# Patient Record
Sex: MALE | Race: WHITE | NOT HISPANIC OR LATINO | Employment: FULL TIME | URBAN - METROPOLITAN AREA
[De-identification: names, ages, dates, MRNs, and addresses within clinical notes are randomized per-mention and may not be internally consistent; named-entity substitution may affect disease eponyms.]

---

## 2024-06-16 ENCOUNTER — HOSPITAL ENCOUNTER (EMERGENCY)
Facility: CLINIC | Age: 59
Discharge: HOME | End: 2024-06-16
Attending: EMERGENCY MEDICINE | Admitting: EMERGENCY MEDICINE
Payer: COMMERCIAL

## 2024-06-16 ENCOUNTER — APPOINTMENT (OUTPATIENT)
Dept: GENERAL RADIOLOGY | Facility: CLINIC | Age: 59
End: 2024-06-16
Attending: EMERGENCY MEDICINE
Payer: COMMERCIAL

## 2024-06-16 VITALS
BODY MASS INDEX: 24.5 KG/M2 | SYSTOLIC BLOOD PRESSURE: 117 MMHG | DIASTOLIC BLOOD PRESSURE: 77 MMHG | TEMPERATURE: 96.8 F | WEIGHT: 175 LBS | OXYGEN SATURATION: 95 % | RESPIRATION RATE: 11 BRPM | HEIGHT: 71 IN | HEART RATE: 58 BPM

## 2024-06-16 DIAGNOSIS — R07.89 ATYPICAL CHEST PAIN: ICD-10-CM

## 2024-06-16 LAB
ALBUMIN SERPL BCG-MCNC: 4.1 G/DL (ref 3.5–5.2)
ALP SERPL-CCNC: 64 U/L (ref 40–150)
ALT SERPL W P-5'-P-CCNC: 32 U/L (ref 0–70)
ANION GAP SERPL CALCULATED.3IONS-SCNC: 11 MMOL/L (ref 7–15)
AST SERPL W P-5'-P-CCNC: 20 U/L (ref 0–45)
ATRIAL RATE - MUSE: 68 BPM
BASOPHILS # BLD AUTO: 0.1 10E3/UL (ref 0–0.2)
BASOPHILS NFR BLD AUTO: 1 %
BILIRUB SERPL-MCNC: 0.3 MG/DL
BUN SERPL-MCNC: 15.5 MG/DL (ref 8–23)
CALCIUM SERPL-MCNC: 9 MG/DL (ref 8.6–10)
CHLORIDE SERPL-SCNC: 103 MMOL/L (ref 98–107)
CREAT SERPL-MCNC: 0.91 MG/DL (ref 0.67–1.17)
D DIMER PPP FEU-MCNC: <0.27 UG/ML FEU (ref 0–0.5)
DEPRECATED HCO3 PLAS-SCNC: 23 MMOL/L (ref 22–29)
DIASTOLIC BLOOD PRESSURE - MUSE: NORMAL MMHG
EGFRCR SERPLBLD CKD-EPI 2021: >90 ML/MIN/1.73M2
EOSINOPHIL # BLD AUTO: 0.3 10E3/UL (ref 0–0.7)
EOSINOPHIL NFR BLD AUTO: 4 %
ERYTHROCYTE [DISTWIDTH] IN BLOOD BY AUTOMATED COUNT: 12.4 % (ref 10–15)
GLUCOSE SERPL-MCNC: 91 MG/DL (ref 70–99)
HCT VFR BLD AUTO: 41.2 % (ref 40–53)
HGB BLD-MCNC: 14.3 G/DL (ref 13.3–17.7)
HOLD SPECIMEN: NORMAL
IMM GRANULOCYTES # BLD: 0 10E3/UL
IMM GRANULOCYTES NFR BLD: 0 %
INTERPRETATION ECG - MUSE: NORMAL
LYMPHOCYTES # BLD AUTO: 2 10E3/UL (ref 0.8–5.3)
LYMPHOCYTES NFR BLD AUTO: 28 %
MCH RBC QN AUTO: 30.4 PG (ref 26.5–33)
MCHC RBC AUTO-ENTMCNC: 34.7 G/DL (ref 31.5–36.5)
MCV RBC AUTO: 88 FL (ref 78–100)
MONOCYTES # BLD AUTO: 0.6 10E3/UL (ref 0–1.3)
MONOCYTES NFR BLD AUTO: 9 %
NEUTROPHILS # BLD AUTO: 4.1 10E3/UL (ref 1.6–8.3)
NEUTROPHILS NFR BLD AUTO: 58 %
NRBC # BLD AUTO: 0 10E3/UL
NRBC BLD AUTO-RTO: 0 /100
P AXIS - MUSE: 65 DEGREES
PLATELET # BLD AUTO: 271 10E3/UL (ref 150–450)
POTASSIUM SERPL-SCNC: 3.8 MMOL/L (ref 3.4–5.3)
PR INTERVAL - MUSE: 164 MS
PROT SERPL-MCNC: 6.5 G/DL (ref 6.4–8.3)
QRS DURATION - MUSE: 98 MS
QT - MUSE: 396 MS
QTC - MUSE: 421 MS
R AXIS - MUSE: 112 DEGREES
RBC # BLD AUTO: 4.71 10E6/UL (ref 4.4–5.9)
SODIUM SERPL-SCNC: 137 MMOL/L (ref 135–145)
SYSTOLIC BLOOD PRESSURE - MUSE: NORMAL MMHG
T AXIS - MUSE: 63 DEGREES
TROPONIN T SERPL HS-MCNC: 11 NG/L
VENTRICULAR RATE- MUSE: 68 BPM
WBC # BLD AUTO: 7.2 10E3/UL (ref 4–11)

## 2024-06-16 PROCEDURE — 85025 COMPLETE CBC W/AUTO DIFF WBC: CPT | Performed by: EMERGENCY MEDICINE

## 2024-06-16 PROCEDURE — 84484 ASSAY OF TROPONIN QUANT: CPT | Performed by: STUDENT IN AN ORGANIZED HEALTH CARE EDUCATION/TRAINING PROGRAM

## 2024-06-16 PROCEDURE — 99285 EMERGENCY DEPT VISIT HI MDM: CPT | Mod: 25

## 2024-06-16 PROCEDURE — 80053 COMPREHEN METABOLIC PANEL: CPT | Performed by: STUDENT IN AN ORGANIZED HEALTH CARE EDUCATION/TRAINING PROGRAM

## 2024-06-16 PROCEDURE — 36415 COLL VENOUS BLD VENIPUNCTURE: CPT | Performed by: STUDENT IN AN ORGANIZED HEALTH CARE EDUCATION/TRAINING PROGRAM

## 2024-06-16 PROCEDURE — 84484 ASSAY OF TROPONIN QUANT: CPT | Performed by: EMERGENCY MEDICINE

## 2024-06-16 PROCEDURE — 71046 X-RAY EXAM CHEST 2 VIEWS: CPT

## 2024-06-16 PROCEDURE — 85025 COMPLETE CBC W/AUTO DIFF WBC: CPT | Performed by: STUDENT IN AN ORGANIZED HEALTH CARE EDUCATION/TRAINING PROGRAM

## 2024-06-16 PROCEDURE — 80053 COMPREHEN METABOLIC PANEL: CPT | Performed by: EMERGENCY MEDICINE

## 2024-06-16 PROCEDURE — 85379 FIBRIN DEGRADATION QUANT: CPT | Performed by: EMERGENCY MEDICINE

## 2024-06-16 PROCEDURE — 93005 ELECTROCARDIOGRAM TRACING: CPT

## 2024-06-16 RX ORDER — VITAMIN B COMPLEX
1 CAPSULE ORAL DAILY
COMMUNITY

## 2024-06-16 RX ORDER — ALBUTEROL SULFATE 90 UG/1
1-2 AEROSOL, METERED RESPIRATORY (INHALATION)
COMMUNITY
Start: 2023-09-15

## 2024-06-16 RX ORDER — OXYCODONE HYDROCHLORIDE 5 MG/1
5 TABLET ORAL
COMMUNITY
Start: 2024-05-31

## 2024-06-16 ASSESSMENT — COLUMBIA-SUICIDE SEVERITY RATING SCALE - C-SSRS
6. HAVE YOU EVER DONE ANYTHING, STARTED TO DO ANYTHING, OR PREPARED TO DO ANYTHING TO END YOUR LIFE?: NO
2. HAVE YOU ACTUALLY HAD ANY THOUGHTS OF KILLING YOURSELF IN THE PAST MONTH?: NO
1. IN THE PAST MONTH, HAVE YOU WISHED YOU WERE DEAD OR WISHED YOU COULD GO TO SLEEP AND NOT WAKE UP?: NO

## 2024-06-16 ASSESSMENT — ACTIVITIES OF DAILY LIVING (ADL)
ADLS_ACUITY_SCORE: 35

## 2024-06-16 NOTE — ED PROVIDER NOTES
"  Emergency Department Note      History of Present Illness     Chief Complaint  Chest Pain    HPI  Geoff Ballesteros is a 59 year old male with history of hyperlipidemia who presents to the ED with his wife for evaluation of chest pain. Patient presents with 3 days of intermittent left sided chest discomfort. Describes the chest pain as a \"dull and aching\" pain. The area is nontender to palpation. No exacerbating or alleviating factors. He went to the gym this morning and had no issues on the Elliptical. No exertional pain or shortness of breath. He does note lightheadedness with an episode of the chest pain yesterday while out of town. Geoff reports groin hernia surgery 2 weeks prior with no complications. No shortness of breath, pain with deep breathing, cough, pharyngitis, fever, rhinorrhea, abdominal pain, nausea, vomiting, lower extremity edema, or cardiac history. He denies any other symptoms.     Independent Historian  None as detailed above.    Review of External Notes  None  Past Medical History   Medical History and Problem List  Bronchospasm  Asthma   Hyperlipidemia  Inguinal hernia  Vitamin D deficiency     Medications  Albuterol  Fluticasone propionate   Oxycodone    Surgical History   Tonsillectomy  Hernia repair x2  Vasectomy  Physical Exam   Patient Vitals for the past 24 hrs:   BP Temp Pulse Resp SpO2 Height Weight   06/16/24 2030 -- -- 58 11 95 % -- --   06/16/24 2015 -- -- 71 15 94 % -- --   06/16/24 2000 117/77 -- 64 18 100 % -- --   06/16/24 1930 116/72 -- 62 -- 97 % -- --   06/16/24 1830 104/69 -- 59 -- 98 % -- --   06/16/24 1815 125/80 -- 61 -- 97 % -- --   06/16/24 1612 109/66 96.8  F (36  C) 65 18 96 % 1.803 m (5' 11\") 79.4 kg (175 lb)     Physical Exam  General: Well appearing, nontoxic. Resting comfortably  Head:  Scalp, face, and head appear normal  Eyes:  Pupils are equal, round    Conjunctivae non-injected and sclerae white  ENT:    The external nose is normal    Pinnae are " normal  Neck:  Normal range of motion    There is no rigidity noted    Trachea is in the midline  CV:  Regular rate and rhythm     Normal S1/S2, no S3/S4    No murmur or rub. Radial pulses 2+ bilaterally.  Resp:  Lungs are clear and equal bilaterally  There is no tachypnea    No increased work of breathing    No rales, wheezing, or rhonchi  GI:  Abdomen is soft, no rigidity or guarding    No distension, or mass    No tenderness or rebound tenderness   MS:  Normal muscular tone. Chest wall normal without tenderness to palpation or overlying skin changes.     Symmetric motor strength    No lower extremity edema. No calf swelling or tenderness.  Skin:  No rash or acute skin lesions noted  Neuro:  Awake and alert  Speech is normal and fluent  Moves all extremities spontaneously  Psych: Normal affect. Appropriate interactions.    Diagnostics   Lab Results   Labs Ordered and Resulted from Time of ED Arrival to Time of ED Departure   COMPREHENSIVE METABOLIC PANEL - Normal       Result Value    Sodium 137      Potassium 3.8      Carbon Dioxide (CO2) 23      Anion Gap 11      Urea Nitrogen 15.5      Creatinine 0.91      GFR Estimate >90      Calcium 9.0      Chloride 103      Glucose 91      Alkaline Phosphatase 64      AST 20      ALT 32      Protein Total 6.5      Albumin 4.1      Bilirubin Total 0.3     TROPONIN T, HIGH SENSITIVITY - Normal    Troponin T, High Sensitivity 11     D DIMER QUANTITATIVE - Normal    D-Dimer Quantitative <0.27     CBC WITH PLATELETS AND DIFFERENTIAL    WBC Count 7.2      RBC Count 4.71      Hemoglobin 14.3      Hematocrit 41.2      MCV 88      MCH 30.4      MCHC 34.7      RDW 12.4      Platelet Count 271      % Neutrophils 58      % Lymphocytes 28      % Monocytes 9      % Eosinophils 4      % Basophils 1      % Immature Granulocytes 0      NRBCs per 100 WBC 0      Absolute Neutrophils 4.1      Absolute Lymphocytes 2.0      Absolute Monocytes 0.6      Absolute Eosinophils 0.3      Absolute  Basophils 0.1      Absolute Immature Granulocytes 0.0      Absolute NRBCs 0.0         Imaging  XR Chest 2 Views   Final Result   IMPRESSION: Negative chest.          EKG   ECG taken at 1609, ECG read at 1611  Normal sinus rhythm  Incomplete right bundle branch block   Possible Right ventricular hypertrophy   Rate 68 bpm. TX interval 164 ms. QRS duration 98 ms. QT/QTc 396/421 ms. P-R-T axes 65 112 63.    Independent Interpretation  CXR: No pneumothorax or pleural effusion.  ED Course    Medications Administered  Medications - No data to display    Procedures  Procedures     Discussion of Management  None    Social Determinants of Health adding to complexity of care  None    ED Course  ED Course as of 06/17/24 1528   Sun Jun 16, 2024   1850 I obtained history and examined the patient as noted above.    2023 On my independent interpretation of the patient's chest radiograph(s) there is no pneumothorax or large pleural effusions.    2026 I rechecked the patient and explained findings.      Medical Decision Making / Diagnosis   CMS Diagnoses: None    MIPS     None    [facility]  Geoff Ballesteros is a 59 year old male who presents for evaluation of chest pain.  Pain is not exertional mild and nonpleuritic.  On my evaluation he is well-appearing, hemodynamically stable.  A broad differential diagnosis is considered including acute coronary syndrome, pulmonary embolism, pleurisy, pericarditis, myocarditis, pneumonia, pneumothorax, pleural effusion, pulmonary edema, thoracic mass, among others.  Patient denies any trauma.  Patient is low risk for ACS. Symptoms have been present for 3 days.  Work-up in the ED is thankfully reassuring.  EKG does not show any acute ischemic changes and is without any evidence for dysrhythmia.  Clinical signs and symptoms are not consistent with pulmonary embolism he has no tachycardia, hypoxia or pleuritic pain.  Chest x-ray was obtained and was unremarkable without any evidence of any of the above  pathologies.  Troponin is negative.  The patient's symptoms are not consistent with a serious underlying primary cardiopulmonary process or other serious etiology. No evidence to suggest acute aortic emergency. It is felt most likely to be musculoskeletal.  Findings and plan of care were discussed with the patient.  Patient is agreeable to plan of care.  I feel that based on the ED evaluation and reassuring findings the patient is safe for discharge and close outpatient follow-up.  Close return precautions were provided to the patient.  he should return immediately or present to the closest ED or call 911 for any worsening. The patient was agreeable to plan of care and he was discharged in stable condition.      Disposition  The patient was discharged.     ICD-10 Codes:    ICD-10-CM    1. Atypical chest pain  R07.89            Discharge Medications  Discharge Medication List as of 6/16/2024  8:26 PM            Scribe Disclosure:  I, Karen Arango, am serving as a scribe at 6:57 PM on 6/16/2024 to document services personally performed by Milad Drummond MD based on my observations and the provider's statements to me.        Milad Drummond MD  06/17/24 5548

## 2024-09-13 ENCOUNTER — HOSPITAL ENCOUNTER (EMERGENCY)
Facility: CLINIC | Age: 59
Discharge: HOME | End: 2024-09-13
Attending: EMERGENCY MEDICINE | Admitting: EMERGENCY MEDICINE
Payer: COMMERCIAL

## 2024-09-13 ENCOUNTER — APPOINTMENT (OUTPATIENT)
Dept: GENERAL RADIOLOGY | Facility: CLINIC | Age: 59
End: 2024-09-13
Attending: EMERGENCY MEDICINE
Payer: COMMERCIAL

## 2024-09-13 VITALS
BODY MASS INDEX: 23.8 KG/M2 | SYSTOLIC BLOOD PRESSURE: 110 MMHG | HEART RATE: 55 BPM | RESPIRATION RATE: 16 BRPM | WEIGHT: 170 LBS | DIASTOLIC BLOOD PRESSURE: 69 MMHG | TEMPERATURE: 97.5 F | OXYGEN SATURATION: 98 % | HEIGHT: 71 IN

## 2024-09-13 DIAGNOSIS — M25.512 ACUTE PAIN OF LEFT SHOULDER: ICD-10-CM

## 2024-09-13 LAB
ANION GAP SERPL CALCULATED.3IONS-SCNC: 8 MMOL/L (ref 7–15)
BASOPHILS # BLD AUTO: 0.1 10E3/UL (ref 0–0.2)
BASOPHILS NFR BLD AUTO: 2 %
BUN SERPL-MCNC: 18.1 MG/DL (ref 8–23)
CALCIUM SERPL-MCNC: 9.1 MG/DL (ref 8.8–10.4)
CHLORIDE SERPL-SCNC: 104 MMOL/L (ref 98–107)
CREAT SERPL-MCNC: 0.92 MG/DL (ref 0.67–1.17)
D DIMER PPP FEU-MCNC: <0.27 UG/ML FEU (ref 0–0.5)
EGFRCR SERPLBLD CKD-EPI 2021: >90 ML/MIN/1.73M2
EOSINOPHIL # BLD AUTO: 0.3 10E3/UL (ref 0–0.7)
EOSINOPHIL NFR BLD AUTO: 6 %
ERYTHROCYTE [DISTWIDTH] IN BLOOD BY AUTOMATED COUNT: 12.7 % (ref 10–15)
GLUCOSE SERPL-MCNC: 104 MG/DL (ref 70–99)
HCO3 SERPL-SCNC: 27 MMOL/L (ref 22–29)
HCT VFR BLD AUTO: 44 % (ref 40–53)
HGB BLD-MCNC: 14.7 G/DL (ref 13.3–17.7)
IMM GRANULOCYTES # BLD: 0 10E3/UL
IMM GRANULOCYTES NFR BLD: 0 %
LYMPHOCYTES # BLD AUTO: 2 10E3/UL (ref 0.8–5.3)
LYMPHOCYTES NFR BLD AUTO: 34 %
MCH RBC QN AUTO: 29.6 PG (ref 26.5–33)
MCHC RBC AUTO-ENTMCNC: 33.4 G/DL (ref 31.5–36.5)
MCV RBC AUTO: 89 FL (ref 78–100)
MONOCYTES # BLD AUTO: 0.6 10E3/UL (ref 0–1.3)
MONOCYTES NFR BLD AUTO: 11 %
NEUTROPHILS # BLD AUTO: 2.7 10E3/UL (ref 1.6–8.3)
NEUTROPHILS NFR BLD AUTO: 47 %
NRBC # BLD AUTO: 0 10E3/UL
NRBC BLD AUTO-RTO: 0 /100
PLATELET # BLD AUTO: 287 10E3/UL (ref 150–450)
POTASSIUM SERPL-SCNC: 4 MMOL/L (ref 3.4–5.3)
RBC # BLD AUTO: 4.97 10E6/UL (ref 4.4–5.9)
SODIUM SERPL-SCNC: 139 MMOL/L (ref 135–145)
TROPONIN T SERPL HS-MCNC: 12 NG/L
TROPONIN T SERPL HS-MCNC: 12 NG/L
WBC # BLD AUTO: 5.8 10E3/UL (ref 4–11)

## 2024-09-13 PROCEDURE — 71046 X-RAY EXAM CHEST 2 VIEWS: CPT

## 2024-09-13 PROCEDURE — 36415 COLL VENOUS BLD VENIPUNCTURE: CPT | Performed by: EMERGENCY MEDICINE

## 2024-09-13 PROCEDURE — 93005 ELECTROCARDIOGRAM TRACING: CPT

## 2024-09-13 PROCEDURE — 85025 COMPLETE CBC W/AUTO DIFF WBC: CPT | Performed by: EMERGENCY MEDICINE

## 2024-09-13 PROCEDURE — 84484 ASSAY OF TROPONIN QUANT: CPT | Performed by: EMERGENCY MEDICINE

## 2024-09-13 PROCEDURE — 80048 BASIC METABOLIC PNL TOTAL CA: CPT | Performed by: EMERGENCY MEDICINE

## 2024-09-13 PROCEDURE — 99285 EMERGENCY DEPT VISIT HI MDM: CPT | Mod: 25

## 2024-09-13 PROCEDURE — 85379 FIBRIN DEGRADATION QUANT: CPT | Performed by: EMERGENCY MEDICINE

## 2024-09-13 ASSESSMENT — ACTIVITIES OF DAILY LIVING (ADL)
ADLS_ACUITY_SCORE: 35

## 2024-09-13 ASSESSMENT — COLUMBIA-SUICIDE SEVERITY RATING SCALE - C-SSRS
1. IN THE PAST MONTH, HAVE YOU WISHED YOU WERE DEAD OR WISHED YOU COULD GO TO SLEEP AND NOT WAKE UP?: NO
2. HAVE YOU ACTUALLY HAD ANY THOUGHTS OF KILLING YOURSELF IN THE PAST MONTH?: NO
6. HAVE YOU EVER DONE ANYTHING, STARTED TO DO ANYTHING, OR PREPARED TO DO ANYTHING TO END YOUR LIFE?: NO

## 2024-09-13 NOTE — ED TRIAGE NOTES
Sudden onset L sharp shoulder pain.  Here # mos ago for CP and workup showed possibly muscular in nature   Triage Assessment (Adult)       Row Name 09/13/24 0356          Triage Assessment    Airway WDL WDL        Respiratory WDL    Respiratory WDL X   Sharp pain L shoulder, not worse with movement or deep breathing        Skin Circulation/Temperature WDL    Skin Circulation/Temperature WDL WDL        Cardiac WDL    Cardiac WDL WDL        Cognitive/Neuro/Behavioral WDL    Cognitive/Neuro/Behavioral WDL WDL

## 2024-09-13 NOTE — ED PROVIDER NOTES
"  Emergency Department Note      History of Present Illness     Chief Complaint   Shoulder Pain (Sudden onset L sharp shoulder pain.  Here # mos ago for CP and workup showed possibly muscular in nature)      HPI   Geoff Ballesteros is a 59 year old male presenting for evaluation of sudden onset left-sided sharp shoulder pain that woke him from sleep.  He was sound asleep approximately an hour ago when he developed a sharp pain, he thought it was more in the muscles, tried to range his arm without improvement but did not notice any change with walking around.  He does note that when he takes a deep breath, the pain slightly worsens but he feels it more lower down in the chest.  He denies recent travel, leg swelling, denies any shortness of breath.  Does have a history of asthma but feels like his breathing is been fine, denies cardiac history.    Independent Historian   None    Review of External Notes   None    Past Medical History     Medical History and Problem List   History reviewed. No pertinent past medical history.    Medications   albuterol (PROAIR HFA/PROVENTIL HFA/VENTOLIN HFA) 108 (90 Base) MCG/ACT inhaler  fluticasone (FLOVENT DISKUS) 50 MCG/ACT inhaler  oxyCODONE (ROXICODONE) 5 MG tablet  vitamin (B COMPLEX) capsule        Surgical History   History reviewed. No pertinent surgical history.    Physical Exam     Patient Vitals for the past 24 hrs:   BP Temp Temp src Pulse Resp SpO2 Height Weight   09/13/24 0351 110/69 97.5  F (36.4  C) Oral 55 16 98 % 1.803 m (5' 11\") 77.1 kg (170 lb)     Physical Exam  Constitutional: Alert, attentive, GCS 15  HENT:    Nose: Nose normal.    Mouth/Throat: Oropharynx is clear, mucous membranes are moist  Eyes: EOM are normal, anicteric, conjugate gaze  CV: regular rate and rhythm   Chest: Effort normal and breath sounds clear without wheezing or rales, symmetric bilaterally   GI:  non tender. No distension. No guarding or rebound.    MSK: No LE edema, no tenderness to " palpation of BLE.  Neurological: Alert, attentive, moving all extremities equally.   Skin: Skin is warm and dry.      Diagnostics     Lab Results   Labs Ordered and Resulted from Time of ED Arrival to Time of ED Departure   BASIC METABOLIC PANEL - Abnormal       Result Value    Sodium 139      Potassium 4.0      Chloride 104      Carbon Dioxide (CO2) 27      Anion Gap 8      Urea Nitrogen 18.1      Creatinine 0.92      GFR Estimate >90      Calcium 9.1      Glucose 104 (*)    TROPONIN T, HIGH SENSITIVITY - Normal    Troponin T, High Sensitivity 12     D DIMER QUANTITATIVE - Normal    D-Dimer Quantitative <0.27     TROPONIN T, HIGH SENSITIVITY - Normal    Troponin T, High Sensitivity 12     CBC WITH PLATELETS AND DIFFERENTIAL    WBC Count 5.8      RBC Count 4.97      Hemoglobin 14.7      Hematocrit 44.0      MCV 89      MCH 29.6      MCHC 33.4      RDW 12.7      Platelet Count 287      % Neutrophils 47      % Lymphocytes 34      % Monocytes 11      % Eosinophils 6      % Basophils 2      % Immature Granulocytes 0      NRBCs per 100 WBC 0      Absolute Neutrophils 2.7      Absolute Lymphocytes 2.0      Absolute Monocytes 0.6      Absolute Eosinophils 0.3      Absolute Basophils 0.1      Absolute Immature Granulocytes 0.0      Absolute NRBCs 0.0         Imaging   Chest XR,  PA & LAT   Final Result   IMPRESSION: No focal airspace consolidation, pleural effusion or pneumothorax. Heart size and pulmonary vascularity are normal.          EKG   ECG (03:46:21):  Indication: Screening for cardiovascular disease.   Rate 55 bpm. UT interval 166. QRS duration 100. QT/QTc 430  Interpretation: Sinus bradycardia with incomplete right bundle  Agree with computer interpretation.   No significant change compared to EKG dated 6/16/24.   Interpreted at 0350 by Dr Solares.       Independent Interpretation   I personally read his chest x-ray, see notes no pneumothorax.    ED Course      Medications Administered   Medications - No data to  display    Procedures   Procedures     Discussion of Management   None    ED Course        Additional Documentation  None    Medical Decision Making / Diagnosis     [facility]   Geoff Ballesteros is a 59 year old male past medical history standard for asthma presenting for noncardiac sounding left-sided shoulder pain that woke him from sleep.  EKG is without ischemic changes on arrival, initial troponin is negative.  D-dimer was obtained due to patient reporting pleuritic component, D-dimer was obtained which fortunately was negative effectively ruling out PE and otherwise low risk patient.  Chest x-ray is clear.  Delta troponin is negative, he is low risk by heart score.  He does not have any clear neck pain or radicular symptoms.  Recommendation of management with Tylenol, ibuprofen, heat pack for comfort.  I recommended PCP follow-up.  Return precautions reviewed.  Patient signed out to oncoming day provider pending repeat troponin, if negative they will discharge the patient dictated.    Disposition   The patient was discharged.     Diagnosis     ICD-10-CM    1. Acute pain of left shoulder  M25.512          Otoniel Solares MD  [employer]  6:18 AM 09/13/24          Otoniel Solares MD  09/13/24 0618

## 2024-09-13 NOTE — DISCHARGE INSTRUCTIONS
I recommend altering dose of Tylenol, ibuprofen for your pain.  Certainly gentle stretching of your neck and shoulders can be helpful.  If you develop worsening chest pain, chest pressure heaviness, weakness in the arm or severe pain in the neck you should return to the emergency department.  Otherwise I would follow-up with your primary doctor for recheck.

## 2024-09-16 LAB
ATRIAL RATE - MUSE: 55 BPM
DIASTOLIC BLOOD PRESSURE - MUSE: NORMAL MMHG
INTERPRETATION ECG - MUSE: NORMAL
P AXIS - MUSE: 54 DEGREES
PR INTERVAL - MUSE: 166 MS
QRS DURATION - MUSE: 100 MS
QT - MUSE: 450 MS
QTC - MUSE: 430 MS
R AXIS - MUSE: 107 DEGREES
SYSTOLIC BLOOD PRESSURE - MUSE: NORMAL MMHG
T AXIS - MUSE: 76 DEGREES
VENTRICULAR RATE- MUSE: 55 BPM